# Patient Record
Sex: MALE | Race: ASIAN | NOT HISPANIC OR LATINO | URBAN - METROPOLITAN AREA
[De-identification: names, ages, dates, MRNs, and addresses within clinical notes are randomized per-mention and may not be internally consistent; named-entity substitution may affect disease eponyms.]

---

## 2019-12-24 ENCOUNTER — INPATIENT (INPATIENT)
Facility: HOSPITAL | Age: 79
LOS: 0 days | Discharge: HOME | End: 2019-12-25
Attending: INTERNAL MEDICINE | Admitting: INTERNAL MEDICINE
Payer: MEDICARE

## 2019-12-24 VITALS
TEMPERATURE: 98 F | HEART RATE: 100 BPM | OXYGEN SATURATION: 97 % | DIASTOLIC BLOOD PRESSURE: 113 MMHG | RESPIRATION RATE: 16 BRPM | SYSTOLIC BLOOD PRESSURE: 193 MMHG

## 2019-12-24 DIAGNOSIS — Z02.9 ENCOUNTER FOR ADMINISTRATIVE EXAMINATIONS, UNSPECIFIED: ICD-10-CM

## 2019-12-24 DIAGNOSIS — I48.91 UNSPECIFIED ATRIAL FIBRILLATION: ICD-10-CM

## 2019-12-24 DIAGNOSIS — R55 SYNCOPE AND COLLAPSE: ICD-10-CM

## 2019-12-24 DIAGNOSIS — I10 ESSENTIAL (PRIMARY) HYPERTENSION: ICD-10-CM

## 2019-12-24 DIAGNOSIS — N40.0 BENIGN PROSTATIC HYPERPLASIA WITHOUT LOWER URINARY TRACT SYMPTOMS: ICD-10-CM

## 2019-12-24 DIAGNOSIS — E11.9 TYPE 2 DIABETES MELLITUS WITHOUT COMPLICATIONS: ICD-10-CM

## 2019-12-24 DIAGNOSIS — Z29.9 ENCOUNTER FOR PROPHYLACTIC MEASURES, UNSPECIFIED: ICD-10-CM

## 2019-12-24 LAB
ALBUMIN SERPL ELPH-MCNC: 4.3 G/DL — SIGNIFICANT CHANGE UP (ref 3.3–5)
ALP SERPL-CCNC: 108 U/L — SIGNIFICANT CHANGE UP (ref 40–120)
ALT FLD-CCNC: 18 U/L — SIGNIFICANT CHANGE UP (ref 4–41)
ANION GAP SERPL CALC-SCNC: 13 MMO/L — SIGNIFICANT CHANGE UP (ref 7–14)
APTT BLD: 37.4 SEC — HIGH (ref 27.5–36.3)
AST SERPL-CCNC: 19 U/L — SIGNIFICANT CHANGE UP (ref 4–40)
BASE EXCESS BLDV CALC-SCNC: 0.7 MMOL/L — SIGNIFICANT CHANGE UP
BILIRUB SERPL-MCNC: 0.4 MG/DL — SIGNIFICANT CHANGE UP (ref 0.2–1.2)
BLOOD GAS VENOUS - CREATININE: 0.87 MG/DL — SIGNIFICANT CHANGE UP (ref 0.5–1.3)
BUN SERPL-MCNC: 23 MG/DL — SIGNIFICANT CHANGE UP (ref 7–23)
CALCIUM SERPL-MCNC: 9.2 MG/DL — SIGNIFICANT CHANGE UP (ref 8.4–10.5)
CHLORIDE BLDV-SCNC: 109 MMOL/L — HIGH (ref 96–108)
CHLORIDE SERPL-SCNC: 106 MMOL/L — SIGNIFICANT CHANGE UP (ref 98–107)
CO2 SERPL-SCNC: 21 MMOL/L — LOW (ref 22–31)
CREAT SERPL-MCNC: 0.95 MG/DL — SIGNIFICANT CHANGE UP (ref 0.5–1.3)
GAS PNL BLDV: 140 MMOL/L — SIGNIFICANT CHANGE UP (ref 136–146)
GLUCOSE BLDV-MCNC: 130 MG/DL — HIGH (ref 70–99)
GLUCOSE SERPL-MCNC: 143 MG/DL — HIGH (ref 70–99)
HCO3 BLDV-SCNC: 24 MMOL/L — SIGNIFICANT CHANGE UP (ref 20–27)
HCT VFR BLDV CALC: 51.4 % — HIGH (ref 39–51)
HGB BLDV-MCNC: 16.8 G/DL — SIGNIFICANT CHANGE UP (ref 13–17)
INR BLD: 1.75 — HIGH (ref 0.88–1.17)
LACTATE BLDV-MCNC: 1.8 MMOL/L — SIGNIFICANT CHANGE UP (ref 0.5–2)
MAGNESIUM SERPL-MCNC: 2 MG/DL — SIGNIFICANT CHANGE UP (ref 1.6–2.6)
NT-PROBNP SERPL-SCNC: 864 PG/ML — SIGNIFICANT CHANGE UP
PCO2 BLDV: 45 MMHG — SIGNIFICANT CHANGE UP (ref 41–51)
PH BLDV: 7.37 PH — SIGNIFICANT CHANGE UP (ref 7.32–7.43)
PHOSPHATE SERPL-MCNC: 3.5 MG/DL — SIGNIFICANT CHANGE UP (ref 2.5–4.5)
PO2 BLDV: 66 MMHG — HIGH (ref 35–40)
POTASSIUM BLDV-SCNC: 3.9 MMOL/L — SIGNIFICANT CHANGE UP (ref 3.4–4.5)
POTASSIUM SERPL-MCNC: 4.1 MMOL/L — SIGNIFICANT CHANGE UP (ref 3.5–5.3)
POTASSIUM SERPL-SCNC: 4.1 MMOL/L — SIGNIFICANT CHANGE UP (ref 3.5–5.3)
PROT SERPL-MCNC: 7.7 G/DL — SIGNIFICANT CHANGE UP (ref 6–8.3)
PROTHROM AB SERPL-ACNC: 19.8 SEC — HIGH (ref 9.8–13.1)
SAO2 % BLDV: 92.4 % — HIGH (ref 60–85)
SODIUM SERPL-SCNC: 140 MMOL/L — SIGNIFICANT CHANGE UP (ref 135–145)
TROPONIN T, HIGH SENSITIVITY: 9 NG/L — SIGNIFICANT CHANGE UP (ref ?–14)

## 2019-12-24 PROCEDURE — 71046 X-RAY EXAM CHEST 2 VIEWS: CPT | Mod: 26

## 2019-12-24 PROCEDURE — 99223 1ST HOSP IP/OBS HIGH 75: CPT

## 2019-12-24 PROCEDURE — 73110 X-RAY EXAM OF WRIST: CPT | Mod: 26,LT

## 2019-12-24 PROCEDURE — 70450 CT HEAD/BRAIN W/O DYE: CPT | Mod: 26

## 2019-12-24 RX ORDER — INSULIN LISPRO 100/ML
VIAL (ML) SUBCUTANEOUS
Refills: 0 | Status: DISCONTINUED | OUTPATIENT
Start: 2019-12-24 | End: 2019-12-25

## 2019-12-24 RX ORDER — TETANUS TOXOID, REDUCED DIPHTHERIA TOXOID AND ACELLULAR PERTUSSIS VACCINE, ADSORBED 5; 2.5; 8; 8; 2.5 [IU]/.5ML; [IU]/.5ML; UG/.5ML; UG/.5ML; UG/.5ML
0.5 SUSPENSION INTRAMUSCULAR ONCE
Refills: 0 | Status: COMPLETED | OUTPATIENT
Start: 2019-12-24 | End: 2019-12-24

## 2019-12-24 RX ORDER — RIVAROXABAN 15 MG-20MG
20 KIT ORAL
Refills: 0 | Status: DISCONTINUED | OUTPATIENT
Start: 2019-12-24 | End: 2019-12-25

## 2019-12-24 RX ORDER — FINASTERIDE 5 MG/1
5 TABLET, FILM COATED ORAL DAILY
Refills: 0 | Status: DISCONTINUED | OUTPATIENT
Start: 2019-12-24 | End: 2019-12-25

## 2019-12-24 RX ORDER — ACETAMINOPHEN 500 MG
650 TABLET ORAL ONCE
Refills: 0 | Status: COMPLETED | OUTPATIENT
Start: 2019-12-24 | End: 2019-12-24

## 2019-12-24 RX ORDER — DEXTROSE 50 % IN WATER 50 %
12.5 SYRINGE (ML) INTRAVENOUS ONCE
Refills: 0 | Status: DISCONTINUED | OUTPATIENT
Start: 2019-12-24 | End: 2019-12-25

## 2019-12-24 RX ORDER — DEXTROSE 50 % IN WATER 50 %
25 SYRINGE (ML) INTRAVENOUS ONCE
Refills: 0 | Status: DISCONTINUED | OUTPATIENT
Start: 2019-12-24 | End: 2019-12-25

## 2019-12-24 RX ORDER — RIVAROXABAN 15 MG-20MG
20 KIT ORAL
Refills: 0 | Status: DISCONTINUED | OUTPATIENT
Start: 2019-12-24 | End: 2019-12-24

## 2019-12-24 RX ORDER — INSULIN LISPRO 100/ML
VIAL (ML) SUBCUTANEOUS AT BEDTIME
Refills: 0 | Status: DISCONTINUED | OUTPATIENT
Start: 2019-12-24 | End: 2019-12-25

## 2019-12-24 RX ORDER — RIVAROXABAN 15 MG-20MG
1 KIT ORAL
Qty: 0 | Refills: 0 | DISCHARGE

## 2019-12-24 RX ORDER — TELMISARTAN 20 MG/1
1 TABLET ORAL
Qty: 0 | Refills: 0 | DISCHARGE

## 2019-12-24 RX ORDER — METOPROLOL TARTRATE 50 MG
25 TABLET ORAL
Refills: 0 | Status: DISCONTINUED | OUTPATIENT
Start: 2019-12-24 | End: 2019-12-24

## 2019-12-24 RX ORDER — FINASTERIDE 5 MG/1
1 TABLET, FILM COATED ORAL
Qty: 0 | Refills: 0 | DISCHARGE

## 2019-12-24 RX ORDER — LOSARTAN POTASSIUM 100 MG/1
50 TABLET, FILM COATED ORAL DAILY
Refills: 0 | Status: DISCONTINUED | OUTPATIENT
Start: 2019-12-24 | End: 2019-12-24

## 2019-12-24 RX ORDER — LOSARTAN POTASSIUM 100 MG/1
50 TABLET, FILM COATED ORAL DAILY
Refills: 0 | Status: DISCONTINUED | OUTPATIENT
Start: 2019-12-24 | End: 2019-12-25

## 2019-12-24 RX ORDER — METOPROLOL TARTRATE 50 MG
1 TABLET ORAL
Qty: 0 | Refills: 0 | DISCHARGE

## 2019-12-24 RX ORDER — GLUCAGON INJECTION, SOLUTION 0.5 MG/.1ML
1 INJECTION, SOLUTION SUBCUTANEOUS ONCE
Refills: 0 | Status: DISCONTINUED | OUTPATIENT
Start: 2019-12-24 | End: 2019-12-25

## 2019-12-24 RX ORDER — SODIUM CHLORIDE 9 MG/ML
1000 INJECTION, SOLUTION INTRAVENOUS
Refills: 0 | Status: DISCONTINUED | OUTPATIENT
Start: 2019-12-24 | End: 2019-12-25

## 2019-12-24 RX ORDER — METOPROLOL TARTRATE 50 MG
25 TABLET ORAL
Refills: 0 | Status: DISCONTINUED | OUTPATIENT
Start: 2019-12-24 | End: 2019-12-25

## 2019-12-24 RX ORDER — DEXTROSE 50 % IN WATER 50 %
15 SYRINGE (ML) INTRAVENOUS ONCE
Refills: 0 | Status: DISCONTINUED | OUTPATIENT
Start: 2019-12-24 | End: 2019-12-25

## 2019-12-24 RX ADMIN — Medication 650 MILLIGRAM(S): at 21:55

## 2019-12-24 RX ADMIN — TETANUS TOXOID, REDUCED DIPHTHERIA TOXOID AND ACELLULAR PERTUSSIS VACCINE, ADSORBED 0.5 MILLILITER(S): 5; 2.5; 8; 8; 2.5 SUSPENSION INTRAMUSCULAR at 21:54

## 2019-12-24 NOTE — ED PROVIDER NOTE - CARE PLAN
Principal Discharge DX:	Syncope and collapse  Secondary Diagnosis:	Wrist pain, acute, left  Secondary Diagnosis:	Atrial fibrillation

## 2019-12-24 NOTE — ED PROVIDER NOTE - OBJECTIVE STATEMENT
Attending/Claribel: 78 yo M w/ h/o HTN (?three medications), on Xarelto (?h/o afib) USOH until ~3pm today while leaving a store episode of syncope. Pt unresponsive < 2 min. Pt denies prodromal symptoms. Pt c/o left wrist pain. Denies CP, SOB, palp, neck pain or weakness.

## 2019-12-24 NOTE — H&P ADULT - PROBLEM SELECTOR PLAN 7
1.  Name of PCP:  2.  PCP Contacted on Admission: [ ] Y    [x] N  [place] Came to visit family.  3.  PCP contacted at Discharge: [ ] Y    [ ] N    [ ] N/A  4.  Post-Discharge Appointment Date and Location:  5.  Summary of Handoff given to PCP:

## 2019-12-24 NOTE — H&P ADULT - NSHPPHYSICALEXAM_GEN_ALL_CORE
T(C): 36.8 (12-24-19 @ 22:37), Max: 36.8 (12-24-19 @ 22:37)  HR: 75 (12-24-19 @ 22:37) (75 - 100)  BP: 194/117 (12-24-19 @ 22:37) (193/113 - 194/117)  RR: 16 (12-24-19 @ 22:37) (16 - 16)  SpO2: 100% (12-24-19 @ 22:37) (97% - 100%)  Wt(kg): --  GENERAL: NAD, well-developed  HEAD:  Atraumatic, Normocephalic  EYES: EOMI, PERRLA, conjunctiva and sclera clear  NECK: Supple, No JVD  CHEST/LUNG: Clear to auscultation bilaterally; No wheeze  HEART: irregularly irregular  ABDOMEN: Soft, Nontender, Nondistended; Bowel sounds present  EXTREMITIES:  2+ Peripheral Pulses, No clubbing, cyanosis, or edema  PSYCH: AAOx3  NEUROLOGY: non-focal  SKIN: No rashes or lesions

## 2019-12-24 NOTE — H&P ADULT - NSHPLABSRESULTS_GEN_ALL_CORE
12-24    140  |  106  |  23  ----------------------------<  143<H>  4.1   |  21<L>  |  0.95    Ca    9.2      24 Dec 2019 21:01  Phos  3.5     12-24  Mg     2.0     12-24    TPro  7.7  /  Alb  4.3  /  TBili  0.4  /  DBili  x   /  AST  19  /  ALT  18  /  AlkPhos  108  12-24    ( 24 Dec 2019 21:01 )   PT: 19.8 SEC;   INR: 1.75 ;    PTT:37.4 SEC    Venous Blood Gas:  12-24 @ 21:50  7.37/45/66/24/92.4  VBG Lactate: 1.8    EKG: afib w/ RVR

## 2019-12-24 NOTE — H&P ADULT - NSICDXPASTMEDICALHX_GEN_ALL_CORE_FT
PAST MEDICAL HISTORY:  Afib     BPH (benign prostatic hyperplasia)     Diabetes mellitus     Essential hypertension

## 2019-12-24 NOTE — ED ADULT TRIAGE NOTE - CHIEF COMPLAINT QUOTE
Pt. c/o syncopal episode approx. 3 hrs ago. Pt. c/o pain and swelling to left wrist. Abrasions and lacerations to left fingers and left knee. Unknown head trauma-on xarelto. Denies cp, sob, dizziness. h/o HTN and DM (states he didn't eat before syncope).

## 2019-12-24 NOTE — ED PROVIDER NOTE - PHYSICAL EXAMINATION
Attending/Claribel; Well-appearing, NAD; Head-atraumatic, PERRL/EOMI, no cpsine or spinal PT, supple, no JVD, RRR, CTAB; Abd-soft, NT/ND, no HSM; no LE edema, A&Ox3, nonfocal; Left wrist-+swelling, PT distal radius, no snuff box PT, ROM limited by pain; elbow, shoulder FROM, no PT, Lt prox tibia +abrasions, no joint eff, FROM, ambulation with nomial gait

## 2019-12-24 NOTE — H&P ADULT - PROBLEM SELECTOR PLAN 1
- Likely vasovagal vs cardiac event. Unlikely 2/2 PE (on xarelto) or seizure (hx not c/w seizure)  - Head CT negative for acute stroke.  - Check TTE. Monitor on Tele  - No clinical signs for acute HF. EKG and clinical hx not c/w ACS.  - BS baseline.

## 2019-12-24 NOTE — H&P ADULT - HISTORY OF PRESENT ILLNESS
78yo M hx of Afib, HTN and DMII p/w cc of syncope and collapse. Patient reported he was in his usual state of health lately. Came from [facility] to visit family in [facility] area. Patient was walking out of a store by himself in the afternoon and suddenly collapsed. Patient does not recall the event and does not think he felt dizziness, cp, palpitation or sob prior to the event. Patient was down for a few seconds with LOC. He was subsequently awaken by security at scene and transferred to [facility]. Patient reported only trauma to left wrist. No confusion afterwards. No head or neck trauma. Patient reported this has never happened to him. He has been compliant on his medication.

## 2019-12-24 NOTE — ED ADULT NURSE NOTE - OBJECTIVE STATEMENT
79yom A&ox4 amb presents to ed s/p syncopal episode this afternoon. pt reports did not take BP meds "was too busy" reports he went to store with his brother when he "blacked out." pt does not recall exact events, unsure if hit head, however reports landing on L wrist. slight swelling noted to wrist. no deformity , + pulses, pt able to move hand and digits. pt deneis cp, sob, dizziness, lightheadedness, fever/chills, n/v/d. breathing even/unlabored. abdomen soft, nontender, nondistended. skin warm, dry, and intact. 20g iv lock placed to L ac. labs sent. nsr on cardiac monitor. pt hypertensive at this time. concepción caban, TILLMAN, vision changes. MD aware.

## 2019-12-24 NOTE — H&P ADULT - NSHPREVIEWOFSYSTEMS_GEN_ALL_CORE
CONSTITUTIONAL: No weakness, fevers or chills  EYES/ENT: No visual changes;  No vertigo or throat pain   NECK: No pain or stiffness  RESPIRATORY: No cough, wheezing, hemoptysis; No shortness of breath  CARDIOVASCULAR: No chest pain or palpitations  GASTROINTESTINAL: No abdominal or epigastric pain. No nausea, vomiting, or hematemesis; No diarrhea or constipation. No melena or hematochezia.  GENITOURINARY: No dysuria, frequency or hematuria  NEUROLOGICAL: No numbness or weakness  SKIN: No itching, burning, rashes, or lesions   PSYCH: No Depression, no anxiety  All other review of systems is negative unless indicated above.

## 2019-12-25 ENCOUNTER — TRANSCRIPTION ENCOUNTER (OUTPATIENT)
Age: 79
End: 2019-12-25

## 2019-12-25 VITALS
OXYGEN SATURATION: 100 % | TEMPERATURE: 98 F | RESPIRATION RATE: 16 BRPM | SYSTOLIC BLOOD PRESSURE: 172 MMHG | HEART RATE: 82 BPM | DIASTOLIC BLOOD PRESSURE: 123 MMHG

## 2019-12-25 LAB
ALBUMIN SERPL ELPH-MCNC: 4 G/DL — SIGNIFICANT CHANGE UP (ref 3.3–5)
ALP SERPL-CCNC: 99 U/L — SIGNIFICANT CHANGE UP (ref 40–120)
ALT FLD-CCNC: 17 U/L — SIGNIFICANT CHANGE UP (ref 4–41)
ANION GAP SERPL CALC-SCNC: 14 MMO/L — SIGNIFICANT CHANGE UP (ref 7–14)
AST SERPL-CCNC: 17 U/L — SIGNIFICANT CHANGE UP (ref 4–40)
BASOPHILS # BLD AUTO: 0.03 K/UL — SIGNIFICANT CHANGE UP (ref 0–0.2)
BASOPHILS # BLD AUTO: 0.04 K/UL — SIGNIFICANT CHANGE UP (ref 0–0.2)
BASOPHILS NFR BLD AUTO: 0.4 % — SIGNIFICANT CHANGE UP (ref 0–2)
BASOPHILS NFR BLD AUTO: 0.4 % — SIGNIFICANT CHANGE UP (ref 0–2)
BILIRUB SERPL-MCNC: 0.7 MG/DL — SIGNIFICANT CHANGE UP (ref 0.2–1.2)
BUN SERPL-MCNC: 17 MG/DL — SIGNIFICANT CHANGE UP (ref 7–23)
CALCIUM SERPL-MCNC: 9.2 MG/DL — SIGNIFICANT CHANGE UP (ref 8.4–10.5)
CHLORIDE SERPL-SCNC: 102 MMOL/L — SIGNIFICANT CHANGE UP (ref 98–107)
CO2 SERPL-SCNC: 20 MMOL/L — LOW (ref 22–31)
CREAT SERPL-MCNC: 0.78 MG/DL — SIGNIFICANT CHANGE UP (ref 0.5–1.3)
EOSINOPHIL # BLD AUTO: 0.19 K/UL — SIGNIFICANT CHANGE UP (ref 0–0.5)
EOSINOPHIL # BLD AUTO: 0.22 K/UL — SIGNIFICANT CHANGE UP (ref 0–0.5)
EOSINOPHIL NFR BLD AUTO: 2.2 % — SIGNIFICANT CHANGE UP (ref 0–6)
EOSINOPHIL NFR BLD AUTO: 2.6 % — SIGNIFICANT CHANGE UP (ref 0–6)
GLUCOSE SERPL-MCNC: 126 MG/DL — HIGH (ref 70–99)
HBA1C BLD-MCNC: 6.8 % — HIGH (ref 4–5.6)
HCT VFR BLD CALC: 47.9 % — SIGNIFICANT CHANGE UP (ref 39–50)
HCT VFR BLD CALC: 48.4 % — SIGNIFICANT CHANGE UP (ref 39–50)
HGB BLD-MCNC: 16.3 G/DL — SIGNIFICANT CHANGE UP (ref 13–17)
HGB BLD-MCNC: 16.3 G/DL — SIGNIFICANT CHANGE UP (ref 13–17)
IMM GRANULOCYTES NFR BLD AUTO: 0.1 % — SIGNIFICANT CHANGE UP (ref 0–1.5)
IMM GRANULOCYTES NFR BLD AUTO: 0.3 % — SIGNIFICANT CHANGE UP (ref 0–1.5)
LYMPHOCYTES # BLD AUTO: 2.47 K/UL — SIGNIFICANT CHANGE UP (ref 1–3.3)
LYMPHOCYTES # BLD AUTO: 3.22 K/UL — SIGNIFICANT CHANGE UP (ref 1–3.3)
LYMPHOCYTES # BLD AUTO: 31.7 % — SIGNIFICANT CHANGE UP (ref 13–44)
LYMPHOCYTES # BLD AUTO: 33.9 % — SIGNIFICANT CHANGE UP (ref 13–44)
MAGNESIUM SERPL-MCNC: 2 MG/DL — SIGNIFICANT CHANGE UP (ref 1.6–2.6)
MCHC RBC-ENTMCNC: 29.7 PG — SIGNIFICANT CHANGE UP (ref 27–34)
MCHC RBC-ENTMCNC: 30 PG — SIGNIFICANT CHANGE UP (ref 27–34)
MCHC RBC-ENTMCNC: 33.7 % — SIGNIFICANT CHANGE UP (ref 32–36)
MCHC RBC-ENTMCNC: 34 % — SIGNIFICANT CHANGE UP (ref 32–36)
MCV RBC AUTO: 88.1 FL — SIGNIFICANT CHANGE UP (ref 80–100)
MCV RBC AUTO: 88.3 FL — SIGNIFICANT CHANGE UP (ref 80–100)
MONOCYTES # BLD AUTO: 0.59 K/UL — SIGNIFICANT CHANGE UP (ref 0–0.9)
MONOCYTES # BLD AUTO: 0.72 K/UL — SIGNIFICANT CHANGE UP (ref 0–0.9)
MONOCYTES NFR BLD AUTO: 7.1 % — SIGNIFICANT CHANGE UP (ref 2–14)
MONOCYTES NFR BLD AUTO: 8.1 % — SIGNIFICANT CHANGE UP (ref 2–14)
NEUTROPHILS # BLD AUTO: 4 K/UL — SIGNIFICANT CHANGE UP (ref 1.8–7.4)
NEUTROPHILS # BLD AUTO: 5.93 K/UL — SIGNIFICANT CHANGE UP (ref 1.8–7.4)
NEUTROPHILS NFR BLD AUTO: 54.9 % — SIGNIFICANT CHANGE UP (ref 43–77)
NEUTROPHILS NFR BLD AUTO: 58.3 % — SIGNIFICANT CHANGE UP (ref 43–77)
NRBC # FLD: 0 K/UL — SIGNIFICANT CHANGE UP (ref 0–0)
NRBC # FLD: 0 K/UL — SIGNIFICANT CHANGE UP (ref 0–0)
PHOSPHATE SERPL-MCNC: 3.5 MG/DL — SIGNIFICANT CHANGE UP (ref 2.5–4.5)
PLATELET # BLD AUTO: 147 K/UL — LOW (ref 150–400)
PLATELET # BLD AUTO: 155 K/UL — SIGNIFICANT CHANGE UP (ref 150–400)
PMV BLD: 10.8 FL — SIGNIFICANT CHANGE UP (ref 7–13)
PMV BLD: 11.6 FL — SIGNIFICANT CHANGE UP (ref 7–13)
POTASSIUM SERPL-MCNC: 3.8 MMOL/L — SIGNIFICANT CHANGE UP (ref 3.5–5.3)
POTASSIUM SERPL-SCNC: 3.8 MMOL/L — SIGNIFICANT CHANGE UP (ref 3.5–5.3)
PROT SERPL-MCNC: 7.7 G/DL — SIGNIFICANT CHANGE UP (ref 6–8.3)
RBC # BLD: 5.44 M/UL — SIGNIFICANT CHANGE UP (ref 4.2–5.8)
RBC # BLD: 5.48 M/UL — SIGNIFICANT CHANGE UP (ref 4.2–5.8)
RBC # FLD: 13.1 % — SIGNIFICANT CHANGE UP (ref 10.3–14.5)
RBC # FLD: 13.2 % — SIGNIFICANT CHANGE UP (ref 10.3–14.5)
SODIUM SERPL-SCNC: 136 MMOL/L — SIGNIFICANT CHANGE UP (ref 135–145)
TROPONIN T, HIGH SENSITIVITY: 9 NG/L — SIGNIFICANT CHANGE UP (ref ?–14)
WBC # BLD: 10.16 K/UL — SIGNIFICANT CHANGE UP (ref 3.8–10.5)
WBC # BLD: 7.29 K/UL — SIGNIFICANT CHANGE UP (ref 3.8–10.5)
WBC # FLD AUTO: 10.16 K/UL — SIGNIFICANT CHANGE UP (ref 3.8–10.5)
WBC # FLD AUTO: 7.29 K/UL — SIGNIFICANT CHANGE UP (ref 3.8–10.5)

## 2019-12-25 PROCEDURE — 93306 TTE W/DOPPLER COMPLETE: CPT | Mod: 26

## 2019-12-25 RX ADMIN — Medication 25 MILLIGRAM(S): at 00:12

## 2019-12-25 RX ADMIN — RIVAROXABAN 20 MILLIGRAM(S): KIT at 00:12

## 2019-12-25 RX ADMIN — FINASTERIDE 5 MILLIGRAM(S): 5 TABLET, FILM COATED ORAL at 11:51

## 2019-12-25 RX ADMIN — LOSARTAN POTASSIUM 50 MILLIGRAM(S): 100 TABLET, FILM COATED ORAL at 00:12

## 2019-12-25 RX ADMIN — Medication 25 MILLIGRAM(S): at 05:38

## 2019-12-25 NOTE — DISCHARGE NOTE PROVIDER - PROVIDER TOKENS
FREE:[LAST:[Your primary care provider],PHONE:[(   )    -],FAX:[(   )    -]],FREE:[LAST:[Your cardiologist],PHONE:[(   )    -],FAX:[(   )    -]]

## 2019-12-25 NOTE — DISCHARGE NOTE PROVIDER - NSDCCPCAREPLAN_GEN_ALL_CORE_FT
PRINCIPAL DISCHARGE DIAGNOSIS  Diagnosis: Syncope and collapse  Assessment and Plan of Treatment: It is believed that your episode was caused by vasovagal syncope. To avoid events in the future please maintain proper hydration and a well balanced diet. You had an echocardiogram done in the hospital that was unremarkable and your CT scan and chest xray were normal.  **Please follow up with your cardiologist and primary care provider within 1 - 2 weeks of discharge.      SECONDARY DISCHARGE DIAGNOSES  Diagnosis: Atrial fibrillation  Assessment and Plan of Treatment: Please continue your medications as directed and follow-up with your primary provider/cardiologist to further manage your care. Monitor for signs/symptoms of uncontrolled atrial fibrillation, such as, increased heart rate, palpitations, chest pain, dizziness, or shortness of breath - Return to emergency room if these signs/symptoms are present.    Diagnosis: Wrist pain, acute, left  Assessment and Plan of Treatment: You had an xray done while in the hospital. There were no signs of fracture.

## 2019-12-25 NOTE — PROVIDER CONTACT NOTE (OTHER) - ASSESSMENT
Pt is asymptomatic, other VSS and documented in flowsheet. Morning dose of 25mg metoprolol tartrate PO given after BP was taken.

## 2019-12-25 NOTE — DISCHARGE NOTE PROVIDER - NSDCMRMEDTOKEN_GEN_ALL_CORE_FT
finasteride 5 mg oral tablet: 1 tab(s) orally once a day  metoprolol tartrate 25 mg oral tablet: 1 tab(s) orally 2 times a day  telmisartan 40 mg oral tablet: 1 tab(s) orally once a day  Xarelto 20 mg oral tablet: 1 tab(s) orally once a day (in the evening)

## 2019-12-25 NOTE — DISCHARGE NOTE PROVIDER - HOSPITAL COURSE
80yo M hx of Afib, HTN and DMII p/w cc of syncope and collapse and admitted to telemetry for further workup. Patient was seen by cardiology and underwent and TTE which was unremarkable. CTH was negative for acute pathology and CXR without focal consolidations. Patient without clinical signs of CHF and no EKG changes consistent with ACS. The patient was continued on Lopressor for rate control and Xarelto for AC given his history of Afib. It is thought that this event was secondary to a vasovagal event - of note, per patient he [place] the [place], no injuries found during workup. On 12/25 this case was reviewed with Dr. Gant, the patient is medically stable and optimized for discharge home. All medications were reviewed. 78yo M hx of Afib, HTN and DMII p/w cc of syncope and collapse and admitted to telemetry for further workup. Patient was seen by cardiology and underwent and TTE which was unremarkable. CTH was negative for acute pathology and CXR without focal consolidations. Patient without clinical signs of CHF and no EKG changes consistent with ACS. The patient was continued on Lopressor for rate control and Xarelto for AC given his history of Afib. It is thought that this event was secondary to a vasovagal event - of note, per patient he [place] the mall, no injuries found during workup.     On 12/25 this case was reviewed with Dr. Gant, the patient is medically stable and optimized for discharge home. All medications were reviewed.

## 2019-12-25 NOTE — CONSULT NOTE ADULT - SUBJECTIVE AND OBJECTIVE BOX
Cardiovascular Disease Initial Evaluation    CHIEF COMPLAINT: Slip and Fall    HISTORY OF PRESENT ILLNESS:  This is a 78 year old man with A-fib, HTN and NIDDM who presents after slip and fall. As per patient he came from [facility] to visit family in [facility] area. Patient was walking out of a store by himself in the afternoon and states that he slipped. He put his L hand out and injured it. He denies LOC.  He denies chest pain or SOB.     Allergies  No Known Allergies    	    MEDICATIONS:  losartan 50 milliGRAM(s) Oral daily  metoprolol tartrate 25 milliGRAM(s) Oral two times a day  rivaroxaban 20 milliGRAM(s) Oral with dinner  dextrose 40% Gel 15 Gram(s) Oral once PRN  dextrose 50% Injectable 12.5 Gram(s) IV Push once  dextrose 50% Injectable 25 Gram(s) IV Push once  dextrose 50% Injectable 25 Gram(s) IV Push once  finasteride 5 milliGRAM(s) Oral daily  glucagon  Injectable 1 milliGRAM(s) IntraMuscular once PRN  insulin lispro (HumaLOG) corrective regimen sliding scale   SubCutaneous three times a day before meals  insulin lispro (HumaLOG) corrective regimen sliding scale   SubCutaneous at bedtime    dextrose 5%. 1000 milliLiter(s) IV Continuous <Continuous>      PAST MEDICAL & SURGICAL HISTORY:  Diabetes mellitus  BPH (benign prostatic hyperplasia)  Afib  Essential hypertension  No significant past surgical history      FAMILY HISTORY:  No pertinent family history in first degree relatives      SOCIAL HISTORY:    The patient is a nonsmoker       REVIEW OF SYSTEMS:  See HPI, otherwise complete 14 point review of systems negative      PHYSICAL EXAM:  T(C): 36.4 (12-25-19 @ 05:33), Max: 37.1 (12-25-19 @ 01:23)  HR: 80 (12-25-19 @ 09:50) (75 - 100)  BP: 173/122 (12-25-19 @ 09:50) (158/107 - 195/118)  RR: 17 (12-25-19 @ 09:50) (16 - 18)  SpO2: 98% (12-25-19 @ 09:50) (97% - 100%)  Wt(kg): --  I&O's Summary    24 Dec 2019 07:01  -  25 Dec 2019 07:00  --------------------------------------------------------  IN: 50 mL / OUT: 250 mL / NET: -200 mL        Appearance: No Acute Distress; resting comfortably  HEENT:  Normal oral mucosa, PERRL, EOMI	  Cardiovascular: Normal S1 S2, No JVD, No murmurs/rubs/gallops  Respiratory: Normal respiratory effort; Lungs clear to auscultation bilaterally  Gastrointestinal:  Soft, Non-tender, + BS	  Skin: No rashes, No ecchymoses, No cyanosis	  Neurologic: Non-focal; no weakness  Extremities: No clubbing, cyanosis or edema  Vascular: Peripheral pulses palpable 2+ bilaterally  Psychiatry: A & O x 3, Mood & affect appropriate    Laboratory Data:	 	    CBC Full  -  ( 25 Dec 2019 05:16 )  WBC Count : 7.29 K/uL  Hemoglobin : 16.3 g/dL  Hematocrit : 48.4 %  Platelet Count - Automated : 155 K/uL  Mean Cell Volume : 88.3 fL  Mean Cell Hemoglobin : 29.7 pg  Mean Cell Hemoglobin Concentration : 33.7 %  Auto Neutrophil # : 4.00 K/uL  Auto Lymphocyte # : 2.47 K/uL  Auto Monocyte # : 0.59 K/uL  Auto Eosinophil # : 0.19 K/uL  Auto Basophil # : 0.03 K/uL  Auto Neutrophil % : 54.9 %  Auto Lymphocyte % : 33.9 %  Auto Monocyte % : 8.1 %  Auto Eosinophil % : 2.6 %  Auto Basophil % : 0.4 %    12-25    136  |  102  |  17  ----------------------------<  126<H>  3.8   |  20<L>  |  0.78  12-24    140  |  106  |  23  ----------------------------<  143<H>  4.1   |  21<L>  |  0.95    Ca    9.2      25 Dec 2019 05:16  Ca    9.2      24 Dec 2019 21:01  Phos  3.5     12-25  Phos  3.5     12-24  Mg     2.0     12-25  Mg     2.0     12-24    TPro  7.7  /  Alb  4.0  /  TBili  0.7  /  DBili  x   /  AST  17  /  ALT  17  /  AlkPhos  99  12-25  TPro  7.7  /  Alb  4.3  /  TBili  0.4  /  DBili  x   /  AST  19  /  ALT  18  /  AlkPhos  108  12-24      proBNP: Serum Pro-Brain Natriuretic Peptide: 864.0 pg/mL (12-24 @ 21:01)    Lipid Profile:   HgA1c: Hemoglobin A1C, Whole Blood: 6.8 % (12-25 @ 05:16)    Interpretation of Telemetry: A-fib 80s	    ECG:  	A-fib    Assessment: 78 year old man with A-fib, HTN and NIDDM who presents with near syncope.    Plan of Care:    #Near syncope-  Patient states that the episode was a slip and fall at the mall.  L hand pain, but XR negative for fracture.  Check echo.    #A-fib-  Rate controlled on Lopressor.  Xarelto for AC.    Discharge home today pending TTE.    #ACP (advance care planning)-  Advanced care planning was discussed with Mr. Guillen. He agrees to wait for echo prior to discharge.  Risks, benefits and alternatives of medical treatment and procedures were discussed in detail and all questions were answered. 30 minutes spent addressing advance care plans.      72 minutes spent on total encounter; more than 50% of the visit was spent counseling and/or coordinating care by the attending physician.   	  Jasen Gant MD [facility]  Cardiovascular Diseases  [phone]

## 2019-12-25 NOTE — DISCHARGE NOTE NURSING/CASE MANAGEMENT/SOCIAL WORK - PATIENT PORTAL LINK FT
You can access the FollowMyHealth Patient Portal offered by [facility] by registering at the following website: [email or web address]. By joining [employer] [facility] [employer]’s FollowMyHealth portal, you will also be able to view your health information using other applications (apps) compatible with our system.

## 2019-12-25 NOTE — DISCHARGE NOTE PROVIDER - NSDCFUADDAPPT_GEN_ALL_CORE_FT
**Please follow up with your cardiologist and primary care provider within 1 - 2 weeks of discharge.

## 2019-12-25 NOTE — CHART NOTE - NSCHARTNOTEFT_GEN_A_CORE
On 12/25 this case was reviewed with Dr. Gant, the patient is medically stable and optimized for discharge. All medications were reviewed and adjusted accordingly.    Noah Alexander PA-C

## 2019-12-25 NOTE — DISCHARGE NOTE PROVIDER - CARE PROVIDER_API CALL
Your primary care provider,   Phone: (   )    -  Fax: (   )    -  Follow Up Time:     Your cardiologist,   Phone: (   )    -  Fax: (   )    -  Follow Up Time:

## 2021-01-25 NOTE — PROVIDER CONTACT NOTE (OTHER) - BACKGROUND
Spoke with patient and discussed results and MD recommendations. Patient confirmed understanding.   Pt admitted for syncope. BP was elevated in ED, pt stated he did not take his daily blood pressure pills today. Pt was given his daily BP meds in ED and BP came down. Pt BP is now elevated again
